# Patient Record
Sex: FEMALE | Race: AMERICAN INDIAN OR ALASKA NATIVE
[De-identification: names, ages, dates, MRNs, and addresses within clinical notes are randomized per-mention and may not be internally consistent; named-entity substitution may affect disease eponyms.]

---

## 2018-08-12 ENCOUNTER — HOSPITAL ENCOUNTER (EMERGENCY)
Dept: HOSPITAL 5 - ED | Age: 63
Discharge: HOME | End: 2018-08-12
Payer: MEDICAID

## 2018-08-12 VITALS — SYSTOLIC BLOOD PRESSURE: 159 MMHG | DIASTOLIC BLOOD PRESSURE: 94 MMHG

## 2018-08-12 DIAGNOSIS — M54.2: Primary | ICD-10-CM

## 2018-08-12 DIAGNOSIS — F20.9: ICD-10-CM

## 2018-08-12 DIAGNOSIS — F41.9: ICD-10-CM

## 2018-08-12 DIAGNOSIS — Z21: ICD-10-CM

## 2018-08-12 DIAGNOSIS — I10: ICD-10-CM

## 2018-08-12 DIAGNOSIS — Z88.8: ICD-10-CM

## 2018-08-12 DIAGNOSIS — F17.200: ICD-10-CM

## 2018-08-12 DIAGNOSIS — M62.838: ICD-10-CM

## 2018-08-12 DIAGNOSIS — F32.9: ICD-10-CM

## 2018-08-12 PROCEDURE — 99284 EMERGENCY DEPT VISIT MOD MDM: CPT

## 2018-08-12 PROCEDURE — 96372 THER/PROPH/DIAG INJ SC/IM: CPT

## 2018-08-12 PROCEDURE — 72125 CT NECK SPINE W/O DYE: CPT
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
## 2018-08-12 NOTE — CAT SCAN REPORT
FINAL REPORT



EXAM:  CT CERVICAL SPINE WO CON



HISTORY:  NECK INJURY 



TECHNIQUE:  CT of the Cervical Spine without IV contrast. Coronal

and sagittal reformatted images were provided.



PRIORS:  None currently available.



FINDINGS:  

There is no fracture.



There is no subluxation.



There is no atlantooccipital dislocation.



Occipitocervical joint is intact. 



Nonspecific straightening of the normal lordotic alignment. 



C1-C2: Mild-to-moderate arthrosis at the acromioclavicular joint.

Mild prominence and partial calcification of the transverse

ligament. No significant canal narrowing. 



C2-C3: Bilateral facet arthropathy. No significant canal or

foraminal narrowing. 



C3-C4: Symmetrical disc osteophyte complex. Bilateral

uncovertebral arthropathy. Mild bilateral foraminal narrowing and

spinal canal narrowing. 



C4-C5: Grade 1 anterior subluxation. Symmetrical disc osteophyte

complex. Superimposed 5.1 mm disc osteophyte protrusion.

Bilateral uncovertebral arthropathy. Severe spinal canal

narrowing. Mild-to-moderate bilateral foraminal narrowing. 



C5-C6: Symmetrical disc osteophyte complex. Left central disc

osteophyte protrusion measures 3.5 mm. Bilateral uncovertebral

arthropathy. Severe spinal canal narrowing. Severe bilateral

foraminal narrowing. 



C6-C7: Symmetrical bulge. Bilateral uncovertebral arthropathy.

Mild spinal canal narrowing. Moderate bilateral foraminal

narrowing. 



C7-T1: No significant canal or foraminal narrowing. 



Partially imaged large left pleural effusion. 



Heterogenous thyroid gland. A distinct nodules not evident; 

however, nodules are not excluded.



Mild carotid vascular calcifications. 



Prevertebral soft tissue structures are unremarkable.



IMPRESSION:  

Nonspecific straightening of the normal lordotic alignment. 



Multilevel degenerative discs. 



No acute fracture.